# Patient Record
Sex: MALE | Race: BLACK OR AFRICAN AMERICAN | NOT HISPANIC OR LATINO | URBAN - METROPOLITAN AREA
[De-identification: names, ages, dates, MRNs, and addresses within clinical notes are randomized per-mention and may not be internally consistent; named-entity substitution may affect disease eponyms.]

---

## 2017-12-08 ENCOUNTER — EMERGENCY (EMERGENCY)
Facility: HOSPITAL | Age: 22
LOS: 0 days | Discharge: ANOTHER HOSPITAL | End: 2017-12-09
Attending: EMERGENCY MEDICINE
Payer: COMMERCIAL

## 2017-12-08 VITALS
TEMPERATURE: 100 F | SYSTOLIC BLOOD PRESSURE: 122 MMHG | HEIGHT: 70 IN | WEIGHT: 160.06 LBS | HEART RATE: 101 BPM | DIASTOLIC BLOOD PRESSURE: 68 MMHG | OXYGEN SATURATION: 98 % | RESPIRATION RATE: 15 BRPM

## 2017-12-08 DIAGNOSIS — J02.9 ACUTE PHARYNGITIS, UNSPECIFIED: ICD-10-CM

## 2017-12-08 DIAGNOSIS — R13.10 DYSPHAGIA, UNSPECIFIED: ICD-10-CM

## 2017-12-08 DIAGNOSIS — R68.83 CHILLS (WITHOUT FEVER): ICD-10-CM

## 2017-12-08 PROCEDURE — 99285 EMERGENCY DEPT VISIT HI MDM: CPT | Mod: 25

## 2017-12-09 ENCOUNTER — EMERGENCY (EMERGENCY)
Facility: HOSPITAL | Age: 22
LOS: 1 days | Discharge: HOME | End: 2017-12-09
Attending: EMERGENCY MEDICINE | Admitting: EMERGENCY MEDICINE
Payer: COMMERCIAL

## 2017-12-09 VITALS
WEIGHT: 160.06 LBS | TEMPERATURE: 98 F | RESPIRATION RATE: 17 BRPM | HEART RATE: 86 BPM | SYSTOLIC BLOOD PRESSURE: 119 MMHG | DIASTOLIC BLOOD PRESSURE: 78 MMHG | OXYGEN SATURATION: 100 % | HEIGHT: 70 IN

## 2017-12-09 VITALS
HEART RATE: 75 BPM | DIASTOLIC BLOOD PRESSURE: 82 MMHG | SYSTOLIC BLOOD PRESSURE: 122 MMHG | RESPIRATION RATE: 18 BRPM | TEMPERATURE: 98 F | OXYGEN SATURATION: 99 %

## 2017-12-09 VITALS
RESPIRATION RATE: 20 BRPM | HEART RATE: 67 BPM | DIASTOLIC BLOOD PRESSURE: 61 MMHG | SYSTOLIC BLOOD PRESSURE: 106 MMHG | TEMPERATURE: 97 F | OXYGEN SATURATION: 98 %

## 2017-12-09 LAB
ALBUMIN SERPL ELPH-MCNC: 3.7 G/DL — SIGNIFICANT CHANGE UP (ref 3.3–5)
ALP SERPL-CCNC: 73 U/L — SIGNIFICANT CHANGE UP (ref 40–120)
ALT FLD-CCNC: 21 U/L — SIGNIFICANT CHANGE UP (ref 12–78)
ANION GAP SERPL CALC-SCNC: 9 MMOL/L — SIGNIFICANT CHANGE UP (ref 5–17)
AST SERPL-CCNC: 18 U/L — SIGNIFICANT CHANGE UP (ref 15–37)
BASOPHILS # BLD AUTO: 0.2 K/UL — SIGNIFICANT CHANGE UP (ref 0–0.2)
BASOPHILS NFR BLD AUTO: 1.2 % — SIGNIFICANT CHANGE UP (ref 0–2)
BILIRUB SERPL-MCNC: 0.4 MG/DL — SIGNIFICANT CHANGE UP (ref 0.2–1.2)
BUN SERPL-MCNC: 8 MG/DL — SIGNIFICANT CHANGE UP (ref 7–23)
CALCIUM SERPL-MCNC: 9.2 MG/DL — SIGNIFICANT CHANGE UP (ref 8.5–10.1)
CHLORIDE SERPL-SCNC: 99 MMOL/L — SIGNIFICANT CHANGE UP (ref 96–108)
CO2 SERPL-SCNC: 29 MMOL/L — SIGNIFICANT CHANGE UP (ref 22–31)
CREAT SERPL-MCNC: 1.24 MG/DL — SIGNIFICANT CHANGE UP (ref 0.5–1.3)
EOSINOPHIL # BLD AUTO: 0 K/UL — SIGNIFICANT CHANGE UP (ref 0–0.5)
EOSINOPHIL NFR BLD AUTO: 0.1 % — SIGNIFICANT CHANGE UP (ref 0–6)
GLUCOSE SERPL-MCNC: 103 MG/DL — HIGH (ref 70–99)
HCT VFR BLD CALC: 49 % — SIGNIFICANT CHANGE UP (ref 39–50)
HGB BLD-MCNC: 15.9 G/DL — SIGNIFICANT CHANGE UP (ref 13–17)
LYMPHOCYTES # BLD AUTO: 1.6 K/UL — SIGNIFICANT CHANGE UP (ref 1–3.3)
LYMPHOCYTES # BLD AUTO: 11.8 % — LOW (ref 13–44)
MCHC RBC-ENTMCNC: 27.4 PG — SIGNIFICANT CHANGE UP (ref 27–34)
MCHC RBC-ENTMCNC: 32.5 GM/DL — SIGNIFICANT CHANGE UP (ref 32–36)
MCV RBC AUTO: 84.4 FL — SIGNIFICANT CHANGE UP (ref 80–100)
MONOCYTES # BLD AUTO: 0.9 K/UL — SIGNIFICANT CHANGE UP (ref 0–0.9)
MONOCYTES NFR BLD AUTO: 6.6 % — SIGNIFICANT CHANGE UP (ref 2–14)
NEUTROPHILS # BLD AUTO: 10.7 K/UL — HIGH (ref 1.8–7.4)
NEUTROPHILS NFR BLD AUTO: 80.3 % — HIGH (ref 43–77)
PLATELET # BLD AUTO: 359 K/UL — SIGNIFICANT CHANGE UP (ref 150–400)
POTASSIUM SERPL-MCNC: 3.9 MMOL/L — SIGNIFICANT CHANGE UP (ref 3.5–5.3)
POTASSIUM SERPL-SCNC: 3.9 MMOL/L — SIGNIFICANT CHANGE UP (ref 3.5–5.3)
PROT SERPL-MCNC: 10.2 GM/DL — HIGH (ref 6–8.3)
RBC # BLD: 5.8 M/UL — SIGNIFICANT CHANGE UP (ref 4.2–5.8)
RBC # FLD: 11.8 % — SIGNIFICANT CHANGE UP (ref 11–15)
SODIUM SERPL-SCNC: 137 MMOL/L — SIGNIFICANT CHANGE UP (ref 135–145)
WBC # BLD: 13.3 K/UL — HIGH (ref 3.8–10.5)
WBC # FLD AUTO: 13.3 K/UL — HIGH (ref 3.8–10.5)

## 2017-12-09 PROCEDURE — 99219: CPT

## 2017-12-09 PROCEDURE — 70491 CT SOFT TISSUE NECK W/DYE: CPT | Mod: 26

## 2017-12-09 RX ORDER — DEXAMETHASONE 0.5 MG/5ML
10 ELIXIR ORAL EVERY 8 HOURS
Qty: 0 | Refills: 0 | Status: DISCONTINUED | OUTPATIENT
Start: 2017-12-09 | End: 2017-12-13

## 2017-12-09 RX ORDER — SODIUM CHLORIDE 9 MG/ML
1000 INJECTION INTRAMUSCULAR; INTRAVENOUS; SUBCUTANEOUS ONCE
Qty: 0 | Refills: 0 | Status: COMPLETED | OUTPATIENT
Start: 2017-12-09 | End: 2017-12-09

## 2017-12-09 RX ORDER — CEPHALEXIN 500 MG
1 CAPSULE ORAL
Qty: 14 | Refills: 0 | OUTPATIENT
Start: 2017-12-09 | End: 2017-12-16

## 2017-12-09 RX ORDER — KETOROLAC TROMETHAMINE 30 MG/ML
15 SYRINGE (ML) INJECTION ONCE
Qty: 0 | Refills: 0 | Status: DISCONTINUED | OUTPATIENT
Start: 2017-12-09 | End: 2017-12-09

## 2017-12-09 RX ORDER — DEXAMETHASONE 0.5 MG/5ML
6 ELIXIR ORAL ONCE
Qty: 0 | Refills: 0 | Status: COMPLETED | OUTPATIENT
Start: 2017-12-09 | End: 2017-12-09

## 2017-12-09 RX ORDER — KETOROLAC TROMETHAMINE 30 MG/ML
30 SYRINGE (ML) INJECTION ONCE
Qty: 0 | Refills: 0 | Status: DISCONTINUED | OUTPATIENT
Start: 2017-12-09 | End: 2017-12-09

## 2017-12-09 RX ORDER — ACETAMINOPHEN 500 MG
650 TABLET ORAL ONCE
Qty: 0 | Refills: 0 | Status: COMPLETED | OUTPATIENT
Start: 2017-12-09 | End: 2017-12-09

## 2017-12-09 RX ORDER — AMPICILLIN SODIUM AND SULBACTAM SODIUM 250; 125 MG/ML; MG/ML
3 INJECTION, POWDER, FOR SUSPENSION INTRAMUSCULAR; INTRAVENOUS EVERY 6 HOURS
Qty: 0 | Refills: 0 | Status: DISCONTINUED | OUTPATIENT
Start: 2017-12-09 | End: 2017-12-13

## 2017-12-09 RX ORDER — METHOCARBAMOL 500 MG/1
500 TABLET, FILM COATED ORAL ONCE
Qty: 0 | Refills: 0 | Status: COMPLETED | OUTPATIENT
Start: 2017-12-09 | End: 2017-12-09

## 2017-12-09 RX ORDER — AMPICILLIN SODIUM AND SULBACTAM SODIUM 250; 125 MG/ML; MG/ML
3 INJECTION, POWDER, FOR SUSPENSION INTRAMUSCULAR; INTRAVENOUS ONCE
Qty: 0 | Refills: 0 | Status: COMPLETED | OUTPATIENT
Start: 2017-12-09 | End: 2017-12-09

## 2017-12-09 RX ADMIN — Medication 6 MILLIGRAM(S): at 07:39

## 2017-12-09 RX ADMIN — Medication 102 MILLIGRAM(S): at 14:30

## 2017-12-09 RX ADMIN — AMPICILLIN SODIUM AND SULBACTAM SODIUM 200 GRAM(S): 250; 125 INJECTION, POWDER, FOR SUSPENSION INTRAMUSCULAR; INTRAVENOUS at 18:10

## 2017-12-09 RX ADMIN — METHOCARBAMOL 500 MILLIGRAM(S): 500 TABLET, FILM COATED ORAL at 02:36

## 2017-12-09 RX ADMIN — Medication 15 MILLIGRAM(S): at 03:27

## 2017-12-09 RX ADMIN — Medication 15 MILLIGRAM(S): at 02:37

## 2017-12-09 RX ADMIN — AMPICILLIN SODIUM AND SULBACTAM SODIUM 200 GRAM(S): 250; 125 INJECTION, POWDER, FOR SUSPENSION INTRAMUSCULAR; INTRAVENOUS at 06:40

## 2017-12-09 RX ADMIN — Medication 650 MILLIGRAM(S): at 02:36

## 2017-12-09 RX ADMIN — SODIUM CHLORIDE 1000 MILLILITER(S): 9 INJECTION INTRAMUSCULAR; INTRAVENOUS; SUBCUTANEOUS at 02:37

## 2017-12-09 NOTE — ED CDU PROVIDER INITIAL DAY NOTE - MEDICAL DECISION MAKING DETAILS
21 y/o male w/ throat pain and ct showing retropharyngeal cellulitis  -seen by ENT - scope negative  -iv petaron, unasyn  -reeval

## 2017-12-09 NOTE — ED CDU PROVIDER INITIAL DAY NOTE - ATTENDING CONTRIBUTION TO CARE
agree with PA note  22 yr old male with no sig PMH transferred from OSH for retropharyngeal swelling.  Denies fever.  Scoped by ENT with no abscess and minimal swelling appreciated.  Sent to CDU for 1 more round of abx and observation    PE: well appearing; no complaints at this time; VSS; CTAB/L; s1 s2 no m/r/g abd: soft/NT/ND ext: no edema neck: no crepitus

## 2017-12-09 NOTE — ED ADULT NURSE NOTE - CCCP TRG CHIEF CMPLNT
transfer from [facility]  for eval of enlarged adenoids and palatine tonsils/sore throat

## 2017-12-09 NOTE — H&P ADULT - HISTORY OF PRESENT ILLNESS
22 year old male no PMHx presenting with several day history of worsening throat pain. Initially presented to OSH, where he was febrile. He had a CT neck that was which demonstrated retropharyngeal swelling but no apparent abscess. He was transferred here for ORL evaluation. No recent trauma. States he had a worsening sore throat until the pain was intolerable last night. He received unasyn, decadron, and toradol at OSH and now feels much improved. No neck pain or trismus currently. No difficulty breathing, chills, night sweats, or weight loss.

## 2017-12-09 NOTE — ED PROVIDER NOTE - OBJECTIVE STATEMENT
21 y/o M w/ no PMHx presenting from [facility] for ENT eval. Pt notes for several days having worsening pain in throat. Pain when swallowing and when moving neck side to side. Never had this before. At [place], pt received CT of the neck w/ IV contrast which showed retropharyngeal swelling from C1-C6, no discreet abscess. Pt received Unison, Decadron and Toradol. Dr. Markham from ENT was consulted and agreed to eval pt here for scope. Pt feels improvement after medication. Denies trouble breathing or swallowing, feeling comfortable. 21 y/o M w/ no PMHx presenting from [facility] for ENT eval. Pt notes for several days having worsening pain in throat. Pain when swallowing and when moving neck side to side. Never had this before. At [place], pt received CT of the neck w/ IV contrast which showed retropharyngeal swelling from C1-C6, no discreet abscess. Pt received Unison, Decadron and Toradol. Dr. Markham from ENT was consulted and agreed to eval pt here for scope. Pt feels improvement after medication. Denies trouble breathing or swallowing. Feeling comfortable.

## 2017-12-09 NOTE — ED CDU PROVIDER DISPOSITION NOTE - ATTENDING CONTRIBUTION TO CARE
pt feeling better; as documented scope showed minimal swelling/pharyngitis/retropharyngeal cellulitis; received abx and steroids and now much improved; no diffculty speaking, swallowing, breathing    PE: well appearing; VSS; CTAB/L; s1 s2 no m/r/g abd soft/NT/ND ext: no edema

## 2017-12-09 NOTE — ED PROVIDER NOTE - PHYSICAL EXAMINATION
Gen: Alert, mild distress, slightly ill appearing  Head: NC, AT, normal lids/conjunctiva  ENT: normal hearing, patent oropharynx without erythema/exudate, uvula midline  Neck: +supple, +tenderness along SCM, no meningismus/JVD, +Trachea midline  Pulm: Bilateral BS, normal resp effort, no wheeze/stridor/retractions  CV: RRR, no M/R/G, +dist pulses  Abd: soft, NT/ND, +BS, no hepatosplenomegaly  Mskel: no edema/erythema/cyanosis  Skin: no rash, warm/dry  Neuro: AAOx3, no sensory/motor deficits, CN 2-12 intact Gen: Alert, mild distress, slightly ill appearing  Head: NC, AT, normal lids/conjunctiva  ENT: normal hearing, patent oropharynx without erythema/exudate, uvula midline  Neck: supple, guarding on movement, +tenderness along SCM, +cervical lymphadenopathy BL, no meningismus/JVD, +Trachea midline  Pulm: Bilateral BS, normal resp effort, no wheeze/stridor/retractions  CV: RRR, no M/R/G, +dist pulses  Abd: soft, NT/ND, +BS, no hepatosplenomegaly  Mskel: no edema/erythema/cyanosis  Skin: no rash, warm/dry  Neuro: AAOx3, no sensory/motor deficits, CN 2-12 intact

## 2017-12-09 NOTE — ED CDU PROVIDER INITIAL DAY NOTE - DETAILS
23 y/o male w/ throat pain and ct showing retropharyngeal cellulitis  -seen by ENT - scope negative  -iv petaron, unasyn  -reeval

## 2017-12-09 NOTE — ED ADULT TRIAGE NOTE - CCCP TRG CHIEF CMPLNT
transfer from [facility]  for eval of enlarged adenoids and palatine tonsils/sore throat

## 2017-12-09 NOTE — ED PROVIDER NOTE - OBJECTIVE STATEMENT
Pertinent PMH/PSH/FHx/SHx and Review of Systems contained within:  Patient presents to the ED for   neck pain.  Complains of pain throughout neck, worse on swallowing.  Thinks that it may have started after he slept in a chair.  Denies any fevers (febrile here), +chills, sore throat.  Patient is tolerating his secretions, but hesitant to move his neck.  Denies any pain going down his spline.  Denies cough. Pertinent PMH/PSH/FHx/SHx and Review of Systems contained within:  Patient presents to the ED for   neck pain.  Complains of pain throughout neck, worse on swallowing.  Thinks that it may have started after he slept in a chair.  Denies any fevers (febrile here), +chills, sore throat.  Patient is tolerating his secretions, but hesitant to move his neck.  Denies any pain going down his spline.  Denies cough.  Denies any sick contacts or travel.     Relevant PMHx/SHx/SOCHx/FAMH:  Denies significant pmh or psh  Patient denies EtOH/tobacco/illicit substance use.    ROS: + Headache, No photophobia/eye pain/changes in vision, No ear pain/sore throat/dysphagia, No chest pain/palpitations, no SOB/cough/wheeze/stridor, No abdominal pain, No N/V/D/melena, no dysuria/frequency/discharge, No back pain, no rash, no changes in neurological status/function.

## 2017-12-09 NOTE — ED CDU PROVIDER DISPOSITION NOTE - CLINICAL COURSE
pt feeling better; as documented scope showed minimal swelling/pharyngitis/retropharyngeal cellulitis; received abx and steroids and now much improved; no diffculty speaking, swallowing, breathing    stable for d/c

## 2017-12-09 NOTE — ED ADULT NURSE REASSESSMENT NOTE - NS ED NURSE REASSESS COMMENT FT1
pt being transfer to [facility] for ENT, v/s done , EMS arrived pt medicated as ordered, aware and agreed with transfer .

## 2017-12-09 NOTE — H&P ADULT - NSHPPHYSICALEXAM_GEN_ALL_CORE
NAD, alert  Breathing comfortably, no stridor, stertor, or respiratory distress  NC/AT  AU: EAC clear, TM intact, no effusion  Nose: [place] bilaterally, no inferior turbinate hypertrophy, no drainage  OC: tongue appears normal, mild posterior OP fullness, thick secretions present, no trismus  Neck: significant diffuse lymphadenopathy bilaterally, no limited range of motion

## 2017-12-09 NOTE — H&P ADULT - ASSESSMENT
A/P 21 yo M with likely pharyngitis with possible retropharyngeal phlegmon vs. calcific tendinitis. CT imaging was reviewed and there is no apparent abscess in the parapharyngeal or retropharyngeal spaces.  -Admit to CDU for 24 hrs of IV unasyn 3G q6h, IV decadron 8mg q 8h  -Toradol as needed for pain  -Continue diet as tolerated  -Discussed with Dr. Markham who agrees with the plan

## 2017-12-09 NOTE — ED PROVIDER NOTE - PROGRESS NOTE DETAILS
ENT aware, and will see pt after rounds. AJM: seen by ent. requests CDU for abx and steroids. CDU accepted pt

## 2017-12-09 NOTE — ED PROVIDER NOTE - MEDICAL DECISION MAKING DETAILS
Patient presents with fever and throat pain.  CT shows retropharyngeal swelling along length of C spine.  Given large area of infection, needs IV abx, decadron, and discussed with ENT Dr. Markham at [facility], may require scope, if there is any acute worsening, he is in a setting where ENT is available.  Patient not pan cultured given how well he appeared, will defer further lab draws to accepting team.  Patient consents to transfer, reasons for transfer were explained.

## 2017-12-09 NOTE — ED CDU PROVIDER INITIAL DAY NOTE - OBJECTIVE STATEMENT
21 y/o M w/ no PMHx presenting from [facility] for ENT eval. Pt notes for several days having worsening pain in throat. Pain when swallowing and when moving neck side to side. Never had this before. At [place], pt received CT of the neck w/ IV contrast which showed retropharyngeal swelling from C1-C6, no discreet abscess. Pt received Unison, Decadron and Toradol. Dr. Markham from ENT was consulted and agreed to eval pt here for scope. Pt feels improvement after medication. Denies trouble breathing or swallowing. Feeling comfortable.

## 2017-12-11 LAB
CULTURE RESULTS: SIGNIFICANT CHANGE UP
SPECIMEN SOURCE: SIGNIFICANT CHANGE UP

## 2018-12-12 ENCOUNTER — EMERGENCY (EMERGENCY)
Facility: HOSPITAL | Age: 23
LOS: 1 days | Discharge: HOME | End: 2018-12-12
Attending: EMERGENCY MEDICINE | Admitting: EMERGENCY MEDICINE
Payer: COMMERCIAL

## 2018-12-12 VITALS
OXYGEN SATURATION: 100 % | HEART RATE: 92 BPM | RESPIRATION RATE: 16 BRPM | DIASTOLIC BLOOD PRESSURE: 92 MMHG | SYSTOLIC BLOOD PRESSURE: 160 MMHG | TEMPERATURE: 98 F

## 2018-12-12 LAB
APPEARANCE UR: CLEAR — SIGNIFICANT CHANGE UP
BACTERIA # UR AUTO: SIGNIFICANT CHANGE UP
BILIRUB UR-MCNC: NEGATIVE — SIGNIFICANT CHANGE UP
BLOOD UR QL VISUAL: NEGATIVE — SIGNIFICANT CHANGE UP
COLOR SPEC: YELLOW — SIGNIFICANT CHANGE UP
GLUCOSE UR-MCNC: NEGATIVE — SIGNIFICANT CHANGE UP
HYALINE CASTS # UR AUTO: NEGATIVE — SIGNIFICANT CHANGE UP
KETONES UR-MCNC: NEGATIVE — SIGNIFICANT CHANGE UP
LEUKOCYTE ESTERASE UR-ACNC: NEGATIVE — SIGNIFICANT CHANGE UP
MUCOUS THREADS # UR AUTO: SIGNIFICANT CHANGE UP
NITRITE UR-MCNC: NEGATIVE — SIGNIFICANT CHANGE UP
PH UR: 6.5 — SIGNIFICANT CHANGE UP (ref 5–8)
PROT UR-MCNC: 20 — SIGNIFICANT CHANGE UP
RBC CASTS # UR COMP ASSIST: SIGNIFICANT CHANGE UP (ref 0–?)
SP GR SPEC: 1.03 — SIGNIFICANT CHANGE UP (ref 1–1.04)
SQUAMOUS # UR AUTO: SIGNIFICANT CHANGE UP
UROBILINOGEN FLD QL: NORMAL — SIGNIFICANT CHANGE UP
WBC UR QL: SIGNIFICANT CHANGE UP (ref 0–?)

## 2018-12-12 PROCEDURE — 99283 EMERGENCY DEPT VISIT LOW MDM: CPT

## 2018-12-12 RX ORDER — CEFTRIAXONE 500 MG/1
250 INJECTION, POWDER, FOR SOLUTION INTRAMUSCULAR; INTRAVENOUS ONCE
Qty: 0 | Refills: 0 | Status: COMPLETED | OUTPATIENT
Start: 2018-12-12 | End: 2018-12-12

## 2018-12-12 RX ORDER — AZITHROMYCIN 500 MG/1
1000 TABLET, FILM COATED ORAL ONCE
Qty: 0 | Refills: 0 | Status: COMPLETED | OUTPATIENT
Start: 2018-12-12 | End: 2018-12-12

## 2018-12-12 RX ADMIN — AZITHROMYCIN 1000 MILLIGRAM(S): 500 TABLET, FILM COATED ORAL at 22:19

## 2018-12-12 RX ADMIN — CEFTRIAXONE 250 MILLIGRAM(S): 500 INJECTION, POWDER, FOR SOLUTION INTRAMUSCULAR; INTRAVENOUS at 22:19

## 2018-12-12 NOTE — ED PROVIDER NOTE - OBJECTIVE STATEMENT
23M no PMH presents requesting STD check. Had unprotected sex ~1w ago. 23M no PMH presents requesting STD check. Had unprotected sex ~1w ago. Partner then told him today that partner "possibly has gonorrhea." Today pt had slight penile discharge. Denies penile/testicular pain, sores/ulcers, urinary complaints, f/c, rashes, joint pains.

## 2018-12-12 NOTE — ED PROVIDER NOTE - PHYSICAL EXAMINATION
no LE edema, normal equal distal pulses. no LE edema, normal equal distal pulses.  Dr. Collier chaperone: no LAD, no testicular/penile ttp, no sores/ulcers, no penile discharge.

## 2018-12-12 NOTE — ED PROVIDER NOTE - PROGRESS NOTE DETAILS
Refrain from sex for at least 7 days after symptoms resolve.   Repeat testing in 3 months.   Can take tylenol or motrin every 6hrs as needed for fever/pain.  Stay well hydrated.  Return for persistent fever/vomit, uncontrolled pain, worsening breathing. Klepfish: UA wnl, comfortable for dc, outpt pmd f/u. [place]: KATY krishnamurthy, comfortable for dc, outpt pmd f/u.  pt phone [phone]

## 2018-12-12 NOTE — ED PROVIDER NOTE - NSFOLLOWUPINSTRUCTIONS_ED_ALL_ED_FT
Refrain from sex for at least 7 days after symptoms resolve.   Repeat testing in 3 months.   Can take tylenol or motrin every 6hrs as needed for fever/pain.  Stay well hydrated.  Return for persistent fever/vomit, uncontrolled pain, worsening breathing.

## 2018-12-12 NOTE — ED ADULT NURSE NOTE - OBJECTIVE STATEMENT
received pt to intake 13 aox3 in no apparent distress here for STD check after experiencing episode of penile discharge. pt denies and pain, burning, or itching, denies fever chills abdominal pain or any other physical complaints. breaths equal and unlabored, VSS, labs sent medicated as per MD order, pt to be discharged

## 2018-12-12 NOTE — ED PROVIDER NOTE - MEDICAL DECISION MAKING DETAILS
23M no PMH p/w 1d slight penile discharge, unprotected sex w/ someone who may have gonorrhea. No other systemic symptoms. Vitals wnl, exam as above. 23M no PMH p/w 1d slight penile discharge, unprotected sex w/ someone who may have gonorrhea. No other systemic symptoms. Vitals wnl, exam as above.  ddx: Possible STD vs. UTI  Empiric tx. UA, STD testing. Reassess.

## 2018-12-13 LAB
HIV COMBO RESULT: SIGNIFICANT CHANGE UP
HIV1+2 AB SPEC QL: SIGNIFICANT CHANGE UP

## 2018-12-14 LAB
BACTERIA UR CULT: SIGNIFICANT CHANGE UP
C TRACH RRNA SPEC QL NAA+PROBE: SIGNIFICANT CHANGE UP
N GONORRHOEA RRNA SPEC QL NAA+PROBE: SIGNIFICANT CHANGE UP
SPECIMEN SOURCE: SIGNIFICANT CHANGE UP
SPECIMEN SOURCE: SIGNIFICANT CHANGE UP

## 2018-12-17 NOTE — ED POST DISCHARGE NOTE - RESULT SUMMARY
Patient called requesting results of GC/Chlamydia and urine culture, all which were negative. Patient states he is still having some penile discharge. Encouraged follow up with PCP and gave number for [facility] urology for follow up as well.

## 2019-07-24 ENCOUNTER — EMERGENCY (EMERGENCY)
Facility: HOSPITAL | Age: 24
LOS: 1 days | Discharge: HOME | End: 2019-07-24
Attending: EMERGENCY MEDICINE | Admitting: EMERGENCY MEDICINE
Payer: COMMERCIAL

## 2019-07-24 VITALS
OXYGEN SATURATION: 100 % | RESPIRATION RATE: 16 BRPM | TEMPERATURE: 98 F | DIASTOLIC BLOOD PRESSURE: 80 MMHG | HEART RATE: 64 BPM | SYSTOLIC BLOOD PRESSURE: 140 MMHG

## 2019-07-24 LAB
HIV COMBO RESULT: SIGNIFICANT CHANGE UP
HIV1+2 AB SPEC QL: SIGNIFICANT CHANGE UP

## 2019-07-24 PROCEDURE — 99284 EMERGENCY DEPT VISIT MOD MDM: CPT

## 2019-07-24 RX ORDER — AZITHROMYCIN 500 MG/1
1000 TABLET, FILM COATED ORAL ONCE
Refills: 0 | Status: COMPLETED | OUTPATIENT
Start: 2019-07-24 | End: 2019-07-24

## 2019-07-24 RX ORDER — CEFTRIAXONE 500 MG/1
250 INJECTION, POWDER, FOR SOLUTION INTRAMUSCULAR; INTRAVENOUS ONCE
Refills: 0 | Status: COMPLETED | OUTPATIENT
Start: 2019-07-24 | End: 2019-07-24

## 2019-07-24 RX ORDER — IBUPROFEN 200 MG
600 TABLET ORAL ONCE
Refills: 0 | Status: COMPLETED | OUTPATIENT
Start: 2019-07-24 | End: 2019-07-24

## 2019-07-24 RX ADMIN — AZITHROMYCIN 1000 MILLIGRAM(S): 500 TABLET, FILM COATED ORAL at 20:28

## 2019-07-24 RX ADMIN — CEFTRIAXONE 250 MILLIGRAM(S): 500 INJECTION, POWDER, FOR SOLUTION INTRAMUSCULAR; INTRAVENOUS at 20:31

## 2019-07-24 RX ADMIN — Medication 600 MILLIGRAM(S): at 20:28

## 2019-07-24 NOTE — ED PROVIDER NOTE - NSFOLLOWUPINSTRUCTIONS_ED_ALL_ED_FT
You were treated prophylactically for gonorrhea and chlamydia. Your test results will take 2 days at least. If they are abnormal, we will call you.   Return if any worsening symptoms, or no relief.  If you test positive, please make sure all partners are treated as well before starting sexual relations.

## 2019-07-24 NOTE — ED PROVIDER NOTE - OBJECTIVE STATEMENT
24 yom complaining of penile discharge for 2 days. Pt states he has history of similar that was treated as an STD and resolved. Pt is sexually active with one partner, occ protection. No dysuria, no hematuria, no rash, no lesions noted. no back pain, no abd pain. "I feel like my urethra is inflamed."

## 2019-07-26 LAB
C TRACH RRNA SPEC QL NAA+PROBE: DETECTED — SIGNIFICANT CHANGE UP
N GONORRHOEA RRNA SPEC QL NAA+PROBE: SIGNIFICANT CHANGE UP

## 2019-07-28 NOTE — ED POST DISCHARGE NOTE - RESULT SUMMARY
Chlamydia : detected. Patient treated in ED with Azithromycin and Ceftriaxone. Patient contact # [phone] and  VM not set up on either phones alt # [phone] message left with Call Back  P.A. number and hours for return call back.

## 2019-08-28 ENCOUNTER — EMERGENCY (EMERGENCY)
Facility: HOSPITAL | Age: 24
LOS: 1 days | Discharge: HOME | End: 2019-08-28
Attending: STUDENT IN AN ORGANIZED HEALTH CARE EDUCATION/TRAINING PROGRAM | Admitting: STUDENT IN AN ORGANIZED HEALTH CARE EDUCATION/TRAINING PROGRAM
Payer: COMMERCIAL

## 2019-08-28 VITALS
RESPIRATION RATE: 16 BRPM | HEART RATE: 79 BPM | TEMPERATURE: 99 F | OXYGEN SATURATION: 100 % | SYSTOLIC BLOOD PRESSURE: 133 MMHG | DIASTOLIC BLOOD PRESSURE: 84 MMHG

## 2019-08-28 PROCEDURE — 99283 EMERGENCY DEPT VISIT LOW MDM: CPT

## 2019-08-28 NOTE — ED PROVIDER NOTE - PATIENT PORTAL LINK FT
You can access the FollowMyHealth Patient Portal offered by [facility] by registering at the following website: [email or web address]. By joining [facility]’s FollowMyHealth portal, you will also be able to view your health information using other applications (apps) compatible with our system.

## 2019-08-28 NOTE — ED PROVIDER NOTE - CLINICAL SUMMARY MEDICAL DECISION MAKING FREE TEXT BOX
Young male with urethral discharge about 1 month after treatment for GC/Chlamydia, found to have + chlamydia - will Rx doxycycline, f/u outpatient

## 2019-08-28 NOTE — ED PROVIDER NOTE - NS ED ROS FT
Constitutional: no fevers or chills  Cardiac: no palpitations, chest pain  Lungs: no shortness of breath, wheezes  Abd: no abd pain, nausea, vomiting, diarrhea  Genitourinary: no increased urinary frequency, hematuria  Neurology: no sensorimotor deficits, no dizziness, no headache, no visual changes  Skin: no rashes

## 2019-08-28 NOTE — ED PROVIDER NOTE - PHYSICAL EXAMINATION
Gen: no acute distress, well appearing, awake, alert and oriented x 3  Cardiac: regular rate and rhythm, +S1S2  Pulm: Clear to auscultation bilaterally  Abd: soft, nontender, nondistended, no guarding  Back: neg CVA ttp, nontender spine  Extremity: no edema, no deformity, warm and well perfused, FROM all extremities    Neuro: awake, alert, oriented x 3, sensorimotor intact

## 2019-08-28 NOTE — ED PROVIDER NOTE - OBJECTIVE STATEMENT
25 yo male with no PMH p 25 yo male with no PMH presents to ED for evaluation of urethral discharge, seen in ED about 1 month ago for evaluation of urethral burning sensation - was treated for GC/Chlamydia, chlamydia positive. Patient reports he was called about his abnormal result and states that he is here today because his primary doctor is on vacation and he is now having some urethral discharge. Reports that he has not had sex since his prior ER visit. Denies any fevers, chill, nausea, vomiting, abd pain. Denies testicular pain or swelling. Denies any penile lesions/ulceration.
